# Patient Record
Sex: FEMALE | Race: WHITE | Employment: OTHER | URBAN - METROPOLITAN AREA
[De-identification: names, ages, dates, MRNs, and addresses within clinical notes are randomized per-mention and may not be internally consistent; named-entity substitution may affect disease eponyms.]

---

## 2018-01-31 ENCOUNTER — APPOINTMENT (OUTPATIENT)
Dept: CT IMAGING | Facility: HOSPITAL | Age: 83
DRG: 689 | End: 2018-01-31
Attending: HOSPITALIST
Payer: MEDICARE

## 2018-01-31 ENCOUNTER — APPOINTMENT (OUTPATIENT)
Dept: GENERAL RADIOLOGY | Facility: HOSPITAL | Age: 83
DRG: 689 | End: 2018-01-31
Attending: EMERGENCY MEDICINE
Payer: MEDICARE

## 2018-01-31 ENCOUNTER — HOSPITAL ENCOUNTER (INPATIENT)
Facility: HOSPITAL | Age: 83
LOS: 2 days | Discharge: NURSING, REHAB OR LONG-TERM CARE | DRG: 689 | End: 2018-02-02
Attending: EMERGENCY MEDICINE | Admitting: HOSPITALIST
Payer: MEDICARE

## 2018-01-31 DIAGNOSIS — E86.0 DEHYDRATION: ICD-10-CM

## 2018-01-31 DIAGNOSIS — N39.0 URINARY TRACT INFECTION WITHOUT HEMATURIA, SITE UNSPECIFIED: Primary | ICD-10-CM

## 2018-01-31 DIAGNOSIS — R53.1 WEAKNESS GENERALIZED: ICD-10-CM

## 2018-01-31 PROCEDURE — 71045 X-RAY EXAM CHEST 1 VIEW: CPT | Performed by: EMERGENCY MEDICINE

## 2018-01-31 PROCEDURE — 70450 CT HEAD/BRAIN W/O DYE: CPT | Performed by: HOSPITALIST

## 2018-01-31 RX ORDER — SODIUM CHLORIDE 9 MG/ML
INJECTION, SOLUTION INTRAVENOUS
Status: COMPLETED
Start: 2018-01-31 | End: 2018-01-31

## 2018-01-31 RX ORDER — IPRATROPIUM BROMIDE AND ALBUTEROL SULFATE 2.5; .5 MG/3ML; MG/3ML
3 SOLUTION RESPIRATORY (INHALATION) EVERY 6 HOURS PRN
COMMUNITY

## 2018-01-31 RX ORDER — BISACODYL 10 MG
10 SUPPOSITORY, RECTAL RECTAL
Status: DISCONTINUED | OUTPATIENT
Start: 2018-01-31 | End: 2018-02-02

## 2018-01-31 RX ORDER — CIPROFLOXACIN 2 MG/ML
400 INJECTION, SOLUTION INTRAVENOUS EVERY 24 HOURS
COMMUNITY
Start: 2018-01-30 | End: 2018-02-02

## 2018-01-31 RX ORDER — ONDANSETRON 2 MG/ML
4 INJECTION INTRAMUSCULAR; INTRAVENOUS EVERY 6 HOURS PRN
Status: DISCONTINUED | OUTPATIENT
Start: 2018-01-31 | End: 2018-02-02

## 2018-01-31 RX ORDER — ACETAMINOPHEN 325 MG/1
650 TABLET ORAL EVERY 6 HOURS PRN
Status: DISCONTINUED | OUTPATIENT
Start: 2018-01-31 | End: 2018-02-02

## 2018-01-31 RX ORDER — IPRATROPIUM BROMIDE AND ALBUTEROL SULFATE 2.5; .5 MG/3ML; MG/3ML
3 SOLUTION RESPIRATORY (INHALATION) EVERY 6 HOURS PRN
Status: DISCONTINUED | OUTPATIENT
Start: 2018-01-31 | End: 2018-02-02

## 2018-01-31 RX ORDER — NYSTATIN 100000 U/G
1 CREAM TOPICAL DAILY
COMMUNITY

## 2018-01-31 RX ORDER — SODIUM CHLORIDE 9 MG/ML
INJECTION, SOLUTION INTRAVENOUS CONTINUOUS
Status: DISCONTINUED | OUTPATIENT
Start: 2018-01-31 | End: 2018-02-01

## 2018-01-31 RX ORDER — LEVOFLOXACIN 5 MG/ML
750 INJECTION, SOLUTION INTRAVENOUS ONCE
Status: COMPLETED | OUTPATIENT
Start: 2018-01-31 | End: 2018-01-31

## 2018-01-31 RX ORDER — LEVOFLOXACIN 5 MG/ML
750 INJECTION, SOLUTION INTRAVENOUS
Status: DISCONTINUED | OUTPATIENT
Start: 2018-01-31 | End: 2018-02-02

## 2018-01-31 RX ORDER — ECHINACEA PURPUREA EXTRACT 125 MG
2 TABLET ORAL 2 TIMES DAILY
COMMUNITY

## 2018-01-31 RX ORDER — NYSTATIN 100000 U/G
1 CREAM TOPICAL DAILY
Status: DISCONTINUED | OUTPATIENT
Start: 2018-01-31 | End: 2018-02-02

## 2018-01-31 RX ORDER — WARFARIN SODIUM 1 MG/1
1 TABLET ORAL NIGHTLY
Status: DISCONTINUED | OUTPATIENT
Start: 2018-01-31 | End: 2018-02-02

## 2018-01-31 RX ORDER — SODIUM CHLORIDE 0.9 % (FLUSH) 0.9 %
3 SYRINGE (ML) INJECTION AS NEEDED
Status: DISCONTINUED | OUTPATIENT
Start: 2018-01-31 | End: 2018-02-02

## 2018-01-31 RX ORDER — POLYETHYLENE GLYCOL 3350 17 G/17G
17 POWDER, FOR SOLUTION ORAL DAILY PRN
Status: DISCONTINUED | OUTPATIENT
Start: 2018-01-31 | End: 2018-02-02

## 2018-01-31 RX ORDER — ENOXAPARIN SODIUM 100 MG/ML
40 INJECTION SUBCUTANEOUS DAILY
Status: CANCELLED | OUTPATIENT
Start: 2018-01-31

## 2018-01-31 RX ORDER — ECHINACEA PURPUREA EXTRACT 125 MG
2 TABLET ORAL 2 TIMES DAILY
Status: DISCONTINUED | OUTPATIENT
Start: 2018-01-31 | End: 2018-02-02

## 2018-01-31 NOTE — H&P
[facility] Hospitalist H&P       CC: Patient presents with:  Altered Mental Status (neurologic)       PCP: Glenna Wyman MD    ASSESSMENT / PLAN:   Patient is a 80year old female with PMH sig for a-fib, HTN, CHF, CVA with residual R sided weakness, yara patient's family given opportunity to ask questions and note understanding and agree with therapeutic plan as outlined      Angeles Camacho DO    [facility] Hospitalist  Answering Service number: [phone]    HPI       History of Present Illness: Patient is a 80 y 95/68   Pulse 50   Temp (!) 96.9 °F (36.1 °C) (Temporal)   Resp 16   Ht 165.1 cm (5' 5\")   Wt 152 lb 1.9 oz (69 kg)   SpO2 99%   BMI 25.31 kg/m²   General:  Alert, could state name, unable to answer further questions   Head:  Normocephalic, without obviou GLU  142*   CA  8.7       No results for input(s): ALT, AST, ALB, AMYLASE, LIPASE, LDH in the last 72 hours. Invalid input(s): ALPHOS, TBIL, DBIL, TPROT      No results for input(s): TROP in the last 72 hours.     Additional Diagnostics: ECG: NSR, t wa

## 2018-01-31 NOTE — ED NOTES
Patient nonverbal, opens eyes to verbal stimuli. Placed on 4 lpm via nc. Crackles auscultated in bilateral lower lobes. PICC line in place to left upper arm. Receiving abx for recent dx of UTI.  Right arm swelling present due to peripheral IV infiltration a

## 2018-01-31 NOTE — ED PROVIDER NOTES
Patient Seen in: [facility] AND [facility] Emergency Department    History   Patient presents with:  Altered Mental Status (neurologic)    Stated Complaint: AMS    HPI    Patient presents with quite limited history.   The history obtained from medics was that pat stated complaint: AMS  Other systems are as noted in HPI. Constitutional and vital signs reviewed. PSFH elements reviewed from today and agreed except as otherwise stated in HPI.     Physical Exam   ED Triage Vitals [01/31/18 1139]  BP: 105/67  Pu other components within normal limits   BASIC METABOLIC PANEL (8) - Abnormal; Notable for the following:     Glucose 142 (*)     Sodium 146 (*)     [place] 114 (*)     BUN 35 (*)     BUN/CREA Ratio 38.0 (*)     Calculated Osmolality 312 (*)     GFR, Non-A specified.     Medications Prescribed:  Current Discharge Medication List        Present on Admission           ICD-10-CM Noted POA    Urinary tract infection without hematuria N39.0 1/31/2018 Unknown

## 2018-01-31 NOTE — PROGRESS NOTES
[facility] Dosing Service  Antibiotic Dosing    Nayan Chahal is a 80year old female for whom pharmacy is dosing Levofloxacin for treatment of UTI.     Allergies: is allergic to pcns [penicillins]; sulfa antibiotics; cephalosporins; clindamycin;

## 2018-01-31 NOTE — CM/SW NOTE
Per BRIDGETTE Salmon at [facility] family is requesting a speech eval and possibly a NG tube feeding for a few days to see if patient improves. SHAKIRA left for Lydia Nier her daughter to update her on situation.

## 2018-02-01 PROBLEM — Z71.89 ADVANCE CARE PLANNING: Status: ACTIVE | Noted: 2018-02-01

## 2018-02-01 PROBLEM — Z71.89 GOALS OF CARE, COUNSELING/DISCUSSION: Status: ACTIVE | Noted: 2018-02-01

## 2018-02-01 PROCEDURE — 99221 1ST HOSP IP/OBS SF/LOW 40: CPT | Performed by: REGISTERED NURSE

## 2018-02-01 RX ORDER — MAGNESIUM SULFATE HEPTAHYDRATE 40 MG/ML
2 INJECTION, SOLUTION INTRAVENOUS ONCE
Status: COMPLETED | OUTPATIENT
Start: 2018-02-01 | End: 2018-02-01

## 2018-02-01 RX ORDER — POTASSIUM CHLORIDE 29.8 MG/ML
40 INJECTION INTRAVENOUS ONCE
Status: COMPLETED | OUTPATIENT
Start: 2018-02-01 | End: 2018-02-01

## 2018-02-01 RX ORDER — DEXTROSE MONOHYDRATE 50 MG/ML
INJECTION, SOLUTION INTRAVENOUS CONTINUOUS
Status: DISCONTINUED | OUTPATIENT
Start: 2018-02-01 | End: 2018-02-02

## 2018-02-01 NOTE — PLAN OF CARE
Problem: Patient/Family Goals  Goal: Patient/Family Long Term Goal  Patient's Long Term Goal: patient unable to verbalize    Interventions:  - involve family in plan of care  - See additional Care Plan goals for specific interventions    Outcome: Progressi Provide assistive devices as appropriate  - Consider OT/PT consult to assist with strengthening/mobility  - Encourage toileting schedule   Outcome: Progressing      Problem: DISCHARGE PLANNING  Goal: Discharge to home or other facility with appropriate res interventions, skin care algorithm/standards of care as needed   Outcome: Progressing    Goal: Incision(s), wounds(s) or drain site(s) healing without S/S of infection  INTERVENTIONS:  - Assess and document risk factors for pressure ulcer development  - As

## 2018-02-01 NOTE — PROGRESS NOTES
[facility] Hospitalist Progress Note     CC: Hospital Follow up    PCP: Devon Burnham MD       Assessment/Plan:   Patient is a 80year old female with PMH sig for a-fib, HTN, CHF, CVA with residual R sided weakness, dementia AAO x1, bed/wheelchair bound, closely     GOC  -d/w daughter, Bertha Perez via phone on 11/31, confirmed DNR, agreeable to palliative eval      FEN:  - IVF:NS IVF @ 83  - Diet:pureed, no straw  - Lytes:in am     DVT Prophy:INR 1.7, on coumadin, repeat INR today is pending  Dispo: pending clini 96.4   MCH  31.0   MCHC  32.1   RDW  15.3*   WBC  9.5   PLT  195         Recent Labs   Lab  01/31/18   1212   GLU  142*   BUN  35*   CREATSERUM  0.92   GFRAA  >60   GFRNAA  56*   CA  8.7   NA  146*   K  3.9   CL  114*   CO2  25       No results for input(s

## 2018-02-01 NOTE — CM/SW NOTE
SW received order to eval for home needs, social support and ability to obtain meds. SW attempted to contact pt's son, Ethel Dunham and daughter, Fredy Padilla regarding pt's eventual discharge needs.  WESTON left a voicemail for both contacts listed, [phone] and [phone]

## 2018-02-01 NOTE — CONSULTS
[place] 53 [address] Patient Status:  Inpatient    1920 MRN [id number]   Location [address] [facility] Attending Audra Doe, [address] Day # 1 PCP Glenna Wyman MD     Date o She appears overall comfortable with no non-verbal signs of pain or dyspnea noted. She is alert, non-verbal and has BUE contractures. RN tells me pt has not been talking, but did say her first name once. Breathing appears non-labored currently on RA.  No s Intravenous, Once  •  dextrose 5% infusion, , Intravenous, Continuous  •  Normal Saline Flush 0.9 % injection 3 mL, 3 mL, Intravenous, PRN  •  ondansetron HCl (ZOFRAN) injection 4 mg, 4 mg, Intravenous, Q6H PRN  •  PEG 3350 (MIRALAX) powder packet 17 g, 17 lacunar infarct involving left basal ganglia and thalamus is also unchanged. Xr Chest Ap Portable  (cpt=71045)    Result Date: 1/31/2018  CONCLUSION:  1.  No acute disease in the chest.           Objective:  Vital Signs:  Blood pressure 112/81, pulse infection without hematuria, site unspecified    Dehydration    Weakness generalized    Dementia    CVA    LLE/sacral wounds    Goals of care, counseling/discussion  -I was unable to speak with pt's dtr via phone today, but LM to call me back.  -Ongoing GO

## 2018-02-01 NOTE — OCCUPATIONAL THERAPY NOTE
OCCUPATIONAL THERAPY EVALUATION - INPATIENT     Room Number: 420/859-Y  Evaluation Date: 2/1/2018  Type of Evaluation: Initial  Presenting Problem:  (UTI, nonverbal)    Physician Order: IP Consult to Occupational Therapy  Reason for Therapy: ADL/IADL Dysfu Weakness generalized      Past Medical History  Past Medical History:   Diagnosis Date   • [facility]fib ([facility]    [facility]    • Esophageal reflux    • Essential hypertension    • Hyperlipidemia    • Thrombus        Past Surgical History  Hi toilet, bedpan or urinal? : Total  -   Putting on and taking off regular upper body clothing?: Total  -   Taking care of personal grooming such as brushing teeth?: Total  -   Eating meals?: Total    AM-PAC Score:  Score: 6  Approx Degree of Impairment: 100

## 2018-02-01 NOTE — SLP NOTE
ADULT SWALLOWING EVALUATION    ASSESSMENT    ASSESSMENT/OVERALL IMPRESSION:      Pt assessed sitting upright in bed. Pt was fed solid, pureed and thin liquid trials. Bilabial seal adequate with no anterior loss.  Lingual skills slightly reduced for bolus fo MEDICAL HISTORY  Reason for Referral: Altered diet consistency;R/O aspiration (currently NPO; reportedly on pureed at [facility])    Problem List  Principal Problem:    Urinary tract infection without hematuria, site unspecified  Active Problems:    Urinary trac Progress   Goal #2 The patient/family/caregiver will demonstrate understanding and implementation of aspiration precautions and swallow strategies independently over 2 session(s).     In Progress   Goal #3 The patient will utilize compensatory strategies as

## 2018-02-01 NOTE — WOUND PROGRESS NOTE
WOUND CARE NOTE      PLAN   Recommendations:  Turn schedules  Heels elevated using pillows, heel wedge or heel boots to offload heels  Use of lift equipment  To prevent sliding: decrease head of bed and elevate foot of bed as medical condition tolerates Lab Results  Component Value Date   WBC 9.5 01/31/2018   HGB 14.8 01/31/2018   HCT 45.9 01/31/2018    01/31/2018   CREATSERUM 0.92 01/31/2018   BUN 35 (H) 01/31/2018    (H) 01/31/2018   K 3.9 01/31/2018    (H) 01/31/2018   CO2 25 01/

## 2018-02-02 VITALS
OXYGEN SATURATION: 93 % | SYSTOLIC BLOOD PRESSURE: 119 MMHG | WEIGHT: 146.31 LBS | HEIGHT: 65 IN | DIASTOLIC BLOOD PRESSURE: 68 MMHG | BODY MASS INDEX: 24.38 KG/M2 | HEART RATE: 52 BPM | RESPIRATION RATE: 18 BRPM | TEMPERATURE: 96 F

## 2018-02-02 PROCEDURE — 99232 SBSQ HOSP IP/OBS MODERATE 35: CPT | Performed by: REGISTERED NURSE

## 2018-02-02 RX ORDER — SODIUM CHLORIDE 450 MG/100ML
INJECTION, SOLUTION INTRAVENOUS CONTINUOUS
Status: DISCONTINUED | OUTPATIENT
Start: 2018-02-02 | End: 2018-02-02

## 2018-02-02 RX ORDER — SODIUM CHLORIDE 9 MG/ML
INJECTION, SOLUTION INTRAVENOUS ONCE
Status: COMPLETED | OUTPATIENT
Start: 2018-02-02 | End: 2018-02-02

## 2018-02-02 RX ORDER — WARFARIN SODIUM 1 MG/1
1 TABLET ORAL NIGHTLY
Qty: 1 TABLET | Refills: 0 | Status: SHIPPED | OUTPATIENT
Start: 2018-02-02

## 2018-02-02 NOTE — SLP NOTE
SPEECH DAILY NOTE - INPATIENT    ASSESSMENT & PLAN   ASSESSMENT  Pt seen sitting upright in bed for diet chk. Pt very lethargic and required constant verbal cues in order to maintain focus.  Pt often demonstrated a chin tuck position naturally throughout th and swallow strategies.   In Progress   Goal #3 The patient will utilize compensatory strategies as outlined by  BSSE (clinical evaluation) including Slow rate, Small bites, Small sips, Alternate liquids/solids, No straws, Upright 90 degrees, Feed patient w

## 2018-02-02 NOTE — CM/SW NOTE
Addend 236p: SW spoke w/ pt's daughter, Abdulaziz Booker who is aware and agreeable w/ [facility] to follow pt at [facility].   ----------------------------------------------------------------------------  SW spoke w/ Dr. Nilam Tabares that pt's daughter agreeable w/ hosp

## 2018-02-02 NOTE — CONSULTS
[facility]  Palliative Care Follow Up    Emelina Chaves Patient Status:  Inpatient    1920 MRN [id number]   Location [address] [facility] Attending Nahomy Dixon, [facility] Day # 2 PCP Amaya Perales MD     Date of Cons IVPB premix 750 mg, 750 mg, Intravenous, Q48H  •  brimonidine-timolol (COMBIGAN) ophthalmic solution, 2 drop, Left Eye, Q12H  •  ipratropium-albuterol (DUONEB) nebulizer solution 3 mL, 3 mL, Nebulization, Q6H PRN  •  nystatin (MYCOSTATIN) 108321 UNIT/GM cr deficits. +dry mouth  Cardiac: Regular rate and rhythm, S1, S2 normal, no murmur, rub or gallop. Lungs: Clear without wheezes, rales, rhonchi or dullness. Normal excursions and effort.   Abdomen: Soft, non-tender, normal bowel sounds X 4 quadrants, no agustin #[phone]  -Previously completed HCPOA/POLST forms on chart.     Palliative Performance Scale 30%  -UTI, dehydration, poor po intake, advanced dementia, CVA, minimally verbal, contractures, bedbound status dep 6/6 ADL's, dysphagia, wounds, CHF, AFib,  a

## 2018-02-02 NOTE — PLAN OF CARE
Focus:   Data: NOTED YIN CATH OUTPUT AT 2300 WAS 100ML AND ONLY 150ML THIS AM. YIN PATENT, BLADDER NON- DISTENDED. URINE CLEAR. PT WAS ALSO OLIGURIC THE OTHER NIGHT.   Action: DR. Marcial Galvin MADE AWARE OF THE ABOVE, RECEIVED ORDER FOR NS 500ML BOLUS

## 2018-02-02 NOTE — PROGRESS NOTES
[facility] Hospitalist Progress Note     CC: Hospital Follow up    PCP: Nestor Esteban MD       Assessment/Plan:   Patient is a 80year old female with PMH sig for a-fib, HTN, CHF, CVA with residual R sided weakness, dementia AAO x1, bed/wheelchair bound, listed     LLE and small sacral wound  -wound care eval appreciated     Diastolic HF  -monitor I/o, fluid status closely     GOC  -pt with POLST stating DNR/DNI NO feeding tube     FEN:  - IVF:NS IVF @ 83  - Diet:pureed, no straw  - Lytes:in am     DVT Pro LLE dressing in place    Data Review:       Labs:     Recent Labs   Lab  01/31/18   1212  02/01/18   1136  02/02/18   0540   RBC  4.77  4.20  3.96   HGB  14.8  13.2  12.4   HCT  45.9  39.9  37.6   MCV  96.4  95.0  95.0   MCH  31.0  31.4  31.2   MCHC  32.1

## 2018-02-02 NOTE — DIETARY NOTE
NUTRITION NOTE    Received nutritional consult x 2 due to patient's poor oral diet intake. She is on pureed foods, thin liquids-no straw. Patient appears to eat slightly better if fed by staff. Noted palliative care consult.   Discussed patient with

## 2018-02-02 NOTE — PLAN OF CARE
Problem: Patient/Family Goals  Goal: Patient/Family Long Term Goal  Patient's Long Term Goal: patient unable to verbalize    Interventions:  - involve family in plan of care  - See additional Care Plan goals for specific interventions    Outcome: Progressi pt to call for assistance with activity based on assessment  - Modify environment to reduce risk of injury  - Provide assistive devices as appropriate  - Consider OT/PT consult to assist with strengthening/mobility  - Encourage toileting schedule   Outcome Assess and document skin integrity  - Monitor for areas of redness and/or skin breakdown  - Initiate interventions, skin care algorithm/standards of care as needed   Outcome: Progressing    Goal: Incision(s), wounds(s) or drain site(s) healing without S/S

## 2018-02-02 NOTE — CM/SW NOTE
RN told SW that pt is cleared to discharge today and will need ambulance transport. SW spoke w/ Gin Gonzalez from [employer] and arranged ambulance transport to [facility] at [address] [place] [address] update pt's daughter, Shefali Nye regarding discharge time.  Pt's son and da

## 2018-02-02 NOTE — PLAN OF CARE
Patient is returning to [address] [place] [address] for hospice care. She is being transported by [facility]. Her daughter, Neyda, is aware of transfer back to [place].

## 2018-02-02 NOTE — RESTORATIVE THERAPY
RESTORATIVE CARE TREATMENT NOTE    Presenting Problem  Presenting Problem:  (Encephalopahty due to UTI versus Dehydration,H/O dementia)  Presenting Problem:  (UTI, nonverbal)  Presenting Problem: Swallow (\"poor oral intake\")    Precautions  Precautions:

## 2018-02-04 NOTE — DISCHARGE SUMMARY
[facility] Hospitalist Discharge Summary   Patient ID:  Damaris Adams  [id number]  55 year old  12/13/1920    Admit date: 1/31/2018  Discharge date: 2/2/2018  Primary Care Physician: Jude Taylor MD   Attending Physician: No att. providers found alertness while inpatient     Oliguria  -likely due to hypovolemia, did not respond to bolus  -decision to avoid further monitoring/treatment of this given pt's current goc     Hypernatremia  -likely due to poor po intake with IVF  -improved when changed f basal ganglia and thalamus is also unchanged. Discharge Instructions     Medication List      START taking these medications    Warfarin Sodium 1 MG Tabs  Commonly known as:  COUMADIN  Take 1 tablet (1 mg total) by mouth nightly.         CHANGE how

## 2024-01-01 NOTE — PHYSICAL THERAPY NOTE
PHYSICAL THERAPY QUICK EVALUATION - INPATIENT    Room Number: 963/263-K  Evaluation Date: 2/1/2018  Presenting Problem:  (Encephalopahty due to UTI versus Dehydration,H/O dementia)  Physician Order: PT Eval and Treat    Problem List  Principal Problem: Turning over in bed (including adjusting bedclothes, sheets and blankets)?: A Lot   -   Sitting down on and standing up from a chair with arms (e.g., wheelchair, bedside commode, etc.): Unable   -   Moving from lying on back to sitting on the side of the b needs at this time. Pt will be discharged from skilled PT services since pt is at her functional baseline. Recommend pt to be up in the chair as appropriate with nursing assist using lift device for patient and staff safety.  Recommend restorative therapy f -Pale skin

## (undated) NOTE — IP AVS SNAPSHOT
Patient Demographics     Address  [address] [address] Phone  [phone] [facility]  [phone] [facility] E-mail Address  [email or web address]      Emergency Contact(s)     Name Relation Home Work Mayra Pepe [phone]  905- Apply 1 Application topically daily. APPLY ON BOTH ARMPITS          Warfarin Sodium 1 MG Tabs  Commonly known as:  COUMADIN  Next dose due: Tonight at 9pm.      Take 1 tablet (1 mg total) by mouth nightly.    Gris Rasheed, DO               Where to Get Your CBC WITH DIFFERENTIAL WITH PLATELET [[id number]]  Resulted: 02/02/18 0700, Result status: Final result   Ordering provider:  Adriana Huston DO  02/01/18 2300 [facility]   Narrative:   The following orders were created for panel order CBC WITH Components    Component Value Reference Range Flag Lab   Glucose 127 70 - 99 mg/dL [facility]   Sodium 141 136 - 144 mmol/L — [facility]   Potassium 4.0 3.3 - 5.1 mmol/L — [facility]   [place] 114 95 - 110 mmol/L [facility]   CO2 23 22 - 32 m Procedure Component Value Units Date/Time    Urine Culture, Routine Once [[id number]] Collected:  01/31/18 1231    Order Status:  Completed Lab Status:  Final result Updated:  02/01/18 1047    Specimen:  Urine from Urine, clean catch      Urine Culture No -elevate when able, will get u/s RUE if not improving, already on anticoag    Poor oral intake/failure to thrive  -possibly due to UTI vs. Dehydration vs progression of dementia  -treat UTI/dehydration, monitor for improvement  -RD consult, SLP titi  -will usually able to feed her self pureed food with assistance and get wheeled to activities. No new focal deficits, fevers, or other abn noted.        PMH  Past Medical History:   Diagnosis Date   • A-fib [facility])    • Congestive heart disease ([facility] [address] [facility]    • Esophagea Skin: LLE ulceration, no drainage or surrounding erythema. No rashes or lesions. Neurologic: Minimal LUE and b/l LE, RUE is contracted     Diagnostic Data:    CBC/Chem    Recent Labs   Lab  01/31/18   1212   RBC  4.77   HGB  14.8   HCT  45.9   MCV  96. :  Arnaldo Carlton DO (Physician)    Related Notes:  Addendum by Arnaldo Carlton DO (Physician) filed at 1/31/2018  5:11 PM       [facility] Hospitalist H&P       CC: Patient presents with:  Altered Mental Status (neurologic)       PCP: Sebastian Jennings MD Patient and/or patient's family given opportunity to ask questions and note understanding and agree with therapeutic plan as outlined      Tunde Garcia DO    [facility] Hospitalist  Answering Service number: [phone]    HPI       History of Present Illness: Pa OBJECTIVE:  BP 95/68   Pulse 50   Temp (!) 96.9 °F (36.1 °C) (Temporal)   Resp 16   Ht 165.1 cm (5' 5\")   Wt 152 lb 1.9 oz (69 kg)   SpO2 99%   BMI 25.31 kg/m²   General:  Alert, could state name, unable to answer further questions   Head:  Normocephalic, CREATSERUM  0.92   GLU  142*   CA  8.7       No results for input(s): ALT, AST, ALB, AMYLASE, LIPASE, LDH in the last 72 hours. Invalid input(s): ALPHOS, TBIL, DBIL, TPROT      No results for input(s): TROP in the last 72 hours.     Additional Diagnostic Weakness generalized      Past Medical History  Past Medical History:   Diagnosis Date   • A-fib ([facility] [address]    • [facility]    • Esophageal reflux    • Essential hypertension    • Hyperlipidemia    • Thrombus        Past Surgical History -   Moving to and from a bed to a chair (including a wheelchair)?: Total   -   Need to walk in hospital room?: Total   -   Climbing 3-5 steps with a railing?: Total       AM-PAC Score:  Raw Score: 8   PT Approx Degree of Impairment Score: 86.62%   Standard as tolerated by patient. RN is aware of PT eval and findings. PT Discharge Recommendations: 24 hour care/supervision;SNF with Restorative    PLAN  Patient has been evaluated and presents with no skilled Physical Therapy needs at this time.   Patient discha Type of Home: [place] (Lives at [facility])   Home Layout: One level                Lives With: Staff 24 hours        Patient Regularly Uses:  (Per EMR, pt was bed/WC bound requiring assist)    Prior Level of [place]: Pt Gait Assistance: Not tested           Stoop/Curb Assistance: Not tested       Skilled Therapy Provided: AAROM/PROM exs for both LE, supine<>sit and sitting tolerance training    Patient End of Session: In bed;Needs met;Call light within reach;RN aware of s Patient was able to achieve the following goals . .. Patient was able to transfer At previous, functional level   Patient able to ambulate on level surfaces Unable before admission[ST. 1]        Attribution Key    ST. 1 - Mary Mercado PT on 2/1/2018 WEIGHT BEARING RESTRICTION  Weight Bearing Restriction: None                PAIN ASSESSMENT  Rating: Unable to rate  Location:  (seemed to be in pain when both LE AAROM performed)  Management Techniques:  Body mechanics;Breathing techniques;Repositioning due to UTI versus Dehydration. Pt with h/o Dementia and CVA wth Rt side residual weakness. Pt is alert, oriented to her name and was able to follow one step simple commands inconsistently.  Pt seemed to be in pain in legs when gentle ROM performed looking a Therapist    Filed:  2/1/2018 11:32 AM Date of Service:  2/1/2018  8:00 AM Status:  Signed    :  Suzanna Guillen OT (Occupational Therapist)       OCCUPATIONAL THERAPY EVALUATION - INPATIENT     Room Number: 772/969-L  Evaluation Date: 2/1/2018  Type Principal Problem:    Urinary tract infection without hematuria, site unspecified  Active Problems:    Urinary tract infection without hematuria    Dehydration    Weakness generalized      Past Medical History  Past Medical History:   Diagnosis Date   • A- How much help from another person does the patient currently need…  -   Putting on and taking off regular lower body clothing?: Total  -   Bathing (including washing, rinsing, drying)?: Total  -   Toileting, which includes using toilet, bedpan or urinal? : Author:  LYLE Chowdary Service:  (none) Author Type:  SPEECH-LANGUAGE PATHOLOGIST    Filed:  2/2/2018  4:38 PM Date of Service:  2/2/2018  2:43 PM Status:  Signed    :  LYLE Chowdary (SPEECH-LANGUAGE PATHOLOGIST)    Related Notes Pt seen with intake of puree and thin liquid trials. No overt clinical signs of aspiration. No cough and no throat clear across all trials.   In Progress   Goal #2 The patient/family/caregiver will demonstrate understanding and implementation of aspiration

## (undated) NOTE — IP AVS SNAPSHOT
[facility]            (For Outpatient Use Only) Initial Admit Date: 1/31/2018   Inpt/Obs Admit Date: Inpt: 1/31/18 / Obs: N/A   Discharge Date:    Margareth Bhatia:  [de-identified]   MRN: [de-identified]   CSN: [id number]        [facility] February 2, 2018